# Patient Record
Sex: FEMALE | Race: BLACK OR AFRICAN AMERICAN | Employment: FULL TIME | URBAN - METROPOLITAN AREA
[De-identification: names, ages, dates, MRNs, and addresses within clinical notes are randomized per-mention and may not be internally consistent; named-entity substitution may affect disease eponyms.]

---

## 2023-02-10 ENCOUNTER — HOSPITAL ENCOUNTER (EMERGENCY)
Age: 28
Discharge: HOME | End: 2023-02-10
Attending: EMERGENCY MEDICINE
Payer: MEDICAID

## 2023-02-10 VITALS
HEART RATE: 99 BPM | WEIGHT: 180 LBS | OXYGEN SATURATION: 97 % | DIASTOLIC BLOOD PRESSURE: 66 MMHG | TEMPERATURE: 98.7 F | BODY MASS INDEX: 29.99 KG/M2 | HEIGHT: 65 IN | SYSTOLIC BLOOD PRESSURE: 107 MMHG | RESPIRATION RATE: 16 BRPM

## 2023-02-10 DIAGNOSIS — F41.9 ANXIETY AND DEPRESSION: Primary | ICD-10-CM

## 2023-02-10 DIAGNOSIS — F32.A ANXIETY AND DEPRESSION: Primary | ICD-10-CM

## 2023-02-10 PROCEDURE — 99283 EMERGENCY DEPT VISIT LOW MDM: CPT

## 2023-02-10 RX ORDER — CLONAZEPAM 0.5 MG/1
0.5 TABLET ORAL 2 TIMES DAILY PRN
Qty: 6 TABLET | Refills: 0 | Status: SHIPPED | OUTPATIENT
Start: 2023-02-10 | End: 2023-02-15

## 2023-02-10 RX ORDER — FLUOXETINE HYDROCHLORIDE 20 MG/1
20 CAPSULE ORAL DAILY
Qty: 30 CAPSULE | Refills: 0 | Status: SHIPPED | OUTPATIENT
Start: 2023-02-10

## 2023-02-10 ASSESSMENT — ENCOUNTER SYMPTOMS
NAUSEA: 0
CHEST TIGHTNESS: 0
VOMITING: 0
SHORTNESS OF BREATH: 0
COLOR CHANGE: 0
ABDOMINAL PAIN: 0
DIARRHEA: 0

## 2023-02-10 ASSESSMENT — PAIN - FUNCTIONAL ASSESSMENT: PAIN_FUNCTIONAL_ASSESSMENT: NONE - DENIES PAIN

## 2023-02-10 NOTE — ED NOTES
I have reviewed discharge instructions with the patient. The patient verbalized understanding. Patient left ED via Discharge Method: ambulatory to Home with self. Opportunity for questions and clarification provided. Patient given 2 scripts. To continue your aftercare when you leave the hospital, you may receive an automated call from our care team to check in on how you are doing. This is a free service and part of our promise to provide the best care and service to meet your aftercare needs.  If you have questions, or wish to unsubscribe from this service please call [phone]. Thank you for Choosing our [facility] Emergency Department.         Melvin Garzon, RN  02/10/23 Fish [place] RN  02/10/23 [id number]

## 2023-02-10 NOTE — ED PROVIDER NOTES
Emergency Department Provider Note                   PCP:                None Provider               Age: 28 y.o. Sex: female       ICD-10-CM    1. Anxiety and depression  F41.9 clonazePAM (KLONOPIN) 0.5 MG tablet    F32. A           DISPOSITION Decision To Discharge 02/10/2023 12:10:27 PM        Medical Decision Making  Patient is a 79-year-old female with history of anxiety depression presenting to the emergency department with worsening anxiety for the past week. States she was previously maintained on Prozac with as needed Klonopin. She had originally tapered herself off of this, however, has had some increased life stressors lately prompting the return of her anxiety. She has been out of her Prozac for some time. States she is in the process of being established with behavioral health but is several months out from this. Denies HI/SI. Patient's vital stable upon arrival.  Exam unremarkable. She does appear quite anxious and tearful during evaluation. Is caught up in an ongoing custody coats regarding her daughter. We will go ahead and refill her Prozac for 30 days and provide a few Klonopin to use as needed until she can taper back up. Did check  aware and do not believe she is exhibiting drug-seeking behavior. Did have case management to see this patient to provide resources for further establishment with both primary care and behavioral health for continuation of these medications long-term. Return precautions discussed. Patient verbalizes understanding and is agreeable to this plan. Risk  Prescription drug management. Complexity of Problem: 1 stable, chronic illness. (3)        Considerations: Shared decision making was utilized in the care of this patient. No orders of the defined types were placed in this encounter.        Medications - No data to display    Discharge Medication List as of 2/10/2023 12:01 PM        START taking these medications    Details FLUoxetine (PROZAC) 20 MG capsule Take 1 capsule by mouth daily, Disp-30 capsule, R-0Normal      clonazePAM (KLONOPIN) 0.5 MG tablet Take 1 tablet by mouth 2 times daily as needed for Anxiety for up to 5 days. Max Daily Amount: 1 mg, Disp-6 tablet, R-0Normal              Karla Mcclellan is a 28 y.o. female who presents to the Emergency Department with chief complaint of    Chief Complaint   Patient presents with    Anxiety      Patient is a 23-year-old -American female with history of anxiety and depression presenting to emergency department for evaluation of worsening anxiety and depression. States that she has been managed for this in the past with Prozac and as needed Klonopin. States that she was doing well on Prozac for but has since run out of this medication after weaning herself off. Has been off this medicine for several months. States that she has recently been through a custody coats over her daughter which has been very stressful for her. No specific triggers for anxiety. States she is in the process of being reestablished with behavioral health but is estimated to be booked out for at least another 2 to 3 months. She denies any SI or HI. She has no other complaints today. The history is provided by the patient. Review of Systems   Constitutional:  Negative for chills, fatigue and fever. Eyes:  Negative for visual disturbance. Respiratory:  Negative for chest tightness and shortness of breath. Cardiovascular:  Negative for chest pain and palpitations. Gastrointestinal:  Negative for abdominal pain, diarrhea, nausea and vomiting. Genitourinary:  Negative for dysuria. Musculoskeletal:  Negative for arthralgias and myalgias. Skin:  Negative for color change and wound. Neurological:  Negative for dizziness, syncope, weakness, light-headedness and headaches. Psychiatric/Behavioral:  The patient is nervous/anxious.     All other systems reviewed and are negative. No past medical history on file. No past surgical history on file. No family history on file. Social History     Socioeconomic History    Marital status: Single         Patient has no known allergies. Discharge Medication List as of 2/10/2023 12:01 PM           Vitals signs and nursing note reviewed. No data found. Physical Exam  Vitals and nursing note reviewed. Constitutional:       General: She is not in acute distress. Appearance: Normal appearance. She is not ill-appearing. Comments: Somewhat tearful and anxious throughout evaluation. HENT:      Head: Normocephalic and atraumatic. Right Ear: External ear normal.      Left Ear: External ear normal.      Nose: Nose normal.   Eyes:      General: No scleral icterus. Right eye: No discharge. Left eye: No discharge. Extraocular Movements: Extraocular movements intact. Conjunctiva/sclera: Conjunctivae normal.   Cardiovascular:      Rate and Rhythm: Normal rate and regular rhythm. Pulses: Normal pulses. Heart sounds: Normal heart sounds. Pulmonary:      Effort: Pulmonary effort is normal.      Breath sounds: Normal breath sounds. Abdominal:      General: Abdomen is flat. Palpations: Abdomen is soft. Tenderness: There is no abdominal tenderness. Musculoskeletal:         General: Normal range of motion. Cervical back: Normal range of motion and neck supple. Skin:     General: Skin is warm and dry. Neurological:      General: No focal deficit present. Mental Status: She is alert and oriented to person, place, and time. Psychiatric:         Mood and Affect: Mood normal.         Behavior: Behavior normal.        Procedures    No results found for any visits on 02/10/23. No orders to display                       Voice dictation software was used during the making of this note.   This software is not perfect and grammatical and other typographical errors may be present. This note has not been completely proofread for errors.        Katelin Montes, [id number]  02/10/23 [id number]

## 2023-02-10 NOTE — DISCHARGE INSTRUCTIONS
You were evaluated today emergency department with concerns of anxiety and depression. Your vital signs today were normal.  We did refill a month supply of your Prozac as well as send in a few doses of clonazepam.  We will get you set up to see a primary care physician as well as behavioral health to continue these on a long-term basis. Please return with any worsening symptoms or concerns.

## 2023-02-10 NOTE — CARE COORDINATION
Visited with pt a the request of the MD. Pt in need of mental health f/u, pt given info for [facility] and advised to call for a appt. I will fx clinicals and LM on pt's behalf.

## 2023-02-10 NOTE — ED TRIAGE NOTES
Patient advises that she has a history of anxiety for many years and over past 6 months she has been in and out of court because her child's father is trying to get custody and she is extremely stressed. Patient advises she use to see mental health who would prescribe her anxiety meds however has not seen them in years.